# Patient Record
(demographics unavailable — no encounter records)

---

## 2025-01-02 NOTE — REASON FOR VISIT
[Follow-Up: _____] : a [unfilled] follow-up visit [Home] : at home, [unfilled] , at the time of the visit. [Medical Office: ([facility])___] : at the medical office located in  [Verbal consent obtained from patient] : the patient, [unfilled] [FreeTextEntry1] : CT Chest Review [FreeTextEntry2] : 8/17/2022

## 2025-01-02 NOTE — ASSESSMENT
[FreeTextEntry1] : -Plan -10 minutes televisit rendered  #S/P LLL Wedge Resection -CT Chest images reviewed and discussed, revealing,- -Post partial resection left lower lobe with unchanged postsurgical changes -No evidence of recurrence -CT Chest in 1 year -F/U CTS for review I, Chetan Campa saw, examined and reviewed the diagnostic images on patient:  AMBAR DEL REAL on 12/31/2024 and agreed with my Nurse Practitioner's clinical note, physical exam findings and treatment plan. This is surveillance follow-up visit patient has a diagnosis of lung cancer.  She underwent robotic assisted left thoracoscopy lower lobe wedge resection and mediastinal lymph node dissection.  Procedure date 8/17/2022.  Pathology: pT1a N0 adenocarcinoma.  CT of the chest from 12/19/2024 was reviewed over the phone with the patient, no evidence of recurrence or any other suspicious findings.  I recommended CT scan of the chest in 1 year.

## 2025-01-02 NOTE — REASON FOR VISIT
[Follow-Up: _____] : a [unfilled] follow-up visit [Home] : at home, [unfilled] , at the time of the visit. [Medical Office: ([facility])___] : at the medical office located in  [Verbal consent obtained from patient] : the patient, [unfilled] [FreeTextEntry1] : CT Chest Review [FreeTextEntry2] : 8/17/2022

## 2025-01-02 NOTE — HISTORY OF PRESENT ILLNESS
[FreeTextEntry1] : Ms. AMBAR DEL REAL is a 62 year F, former smoker, S/P Robotic LLL Wedge resection for a pT1aN0 adenocarcinoma on 8/17/2022. Lesion initially found on Lung Cancer Screening CT. She presents today via televisit for review of her surveillance CT Chest which showed no evidence of recurrence.  ECOG 0 Lives with , no aide Former smoker- 1-1.5 PPD X 40 years, Quit 2020 COVID History- Never had COVID, Vaccinated + Boosted Denies major psychiatric history Retired [employer] Strong family history of Cancer  Their Healthcare team is as follows: PMD: Dr. Arthur Tejada Pulmonologist: (Dr. Rojo) seen once Neuro: Dr Ford

## 2025-01-02 NOTE — REASON FOR VISIT
[Follow-Up: _____] : a [unfilled] follow-up visit [Home] : at home, [unfilled] , at the time of the visit. [Medical Office: ([facility])___] : at the medical office located in  [Verbal consent obtained from patient] : the patient, [unfilled] [FreeTextEntry1] : CT Chest Review [FreeTextEntry2] : 8/17/2022

## 2025-01-02 NOTE — REASON FOR VISIT
[Follow-Up: _____] : a [unfilled] follow-up visit [Home] : at home, [unfilled] , at the time of the visit. [Medical Office: ([facility])___] : at the medical office located in  [Verbal consent obtained from patient] : the patient, [unfilled] [FreeTextEntry1] : CT Chest Review [FreeTextEntry2] : 8/17/2022